# Patient Record
(demographics unavailable — no encounter records)

---

## 2024-10-28 NOTE — DISCUSSION/SUMMARY
[Medication Risks Reviewed] : Medication risks reviewed [de-identified] : She has been referred to pain management to see if epidural steroid injections or selective nerve root blocks may help her.  She will finish the diclofenac 75 mg twice a day and lowered to 50 mg.  Liver function profile in August was normal.  Long-term use of the nonsteroidals at the full dose is not reasonable nor is living with the symptoms if they get significantly worse.  She has had enough surgical treatment in the past and if surgery could be avoided that would be preferable.  I will see her for follow-up a week after her first epidural.

## 2024-10-28 NOTE — PHYSICAL EXAM
[de-identified] : She is comfortable sitting and straight leg raising is negative to 90 degrees bilaterally.

## 2024-12-01 NOTE — PHYSICAL EXAM
[Normal] : supple, no neck mass and thyroid not enlarged [Normal Neck Lymph Nodes] : normal neck lymph nodes  [Normal Supraclavicular Lymph Nodes] : normal supraclavicular lymph nodes [Normal Groin Lymph Nodes] : normal groin lymph nodes [Normal Axillary Lymph Nodes] : normal axillary lymph nodes [Normal] : oriented to person, place and time, with appropriate affect [de-identified] : bilateral NANCI flaps well healed. mild right breast erythema and swelling - post XRT changes vs mastitis.

## 2024-12-01 NOTE — HISTORY OF PRESENT ILLNESS
[de-identified] : Patient is a 60 y/o F who presents a chief complaint of breast pain. She has a personal history of right breast cancer (stage 1A IDC & DCIS) in 2008 tx with XRT, left breast cancer (high grade DCIS, ER-/IN-) in 2021, and right lung cancer (stage 1A right upper love adenocarcinoma) at age 55.  S/p bilateral mastectomies w/ NANCI flap and L SLNbx in 2021. Not on any endocrine therapy.  Referred by Dr. Ld Sotomayor of med-onc.  PMHx: S/p right upper lobe wedge resection and completion lobectomy and mediastinal lymph node dissection for non-small cell lung ca.  Pt is an employee for [employer].

## 2024-12-01 NOTE — ASSESSMENT
[FreeTextEntry1] : History of breast cancer S/p bilateral mastectomies in 2021 Right breast erythema likely secondary to post XRT changes vs mastitis  Tx with course of abx  Will order breast MRI  RTO 1 week

## 2024-12-01 NOTE — CONSULT LETTER
[Dear  ___] : Dear  [unfilled], [Consult Letter:] : I had the pleasure of evaluating your patient, [unfilled]. [Please see my note below.] : Please see my note below. [Sincerely,] : Sincerely, [FreeTextEntry3] : Eric Roman MD FACS

## 2024-12-01 NOTE — ADDENDUM
[FreeTextEntry1] : I, Kasey Ashton, acted solely as a scribe for Dr. Eric Roman on this date 11/27/2024.

## 2024-12-01 NOTE — HISTORY OF PRESENT ILLNESS
[de-identified] : Patient is a 58 y/o F who presents a chief complaint of breast pain. She has a personal history of right breast cancer (stage 1A IDC & DCIS) in 2008 tx with XRT, left breast cancer (high grade DCIS, ER-/[place]-) in 2021, and right lung cancer (stage 1A right upper love adenocarcinoma) at age 55.  S/p bilateral mastectomies w/ NANCI flap and L SLNbx in 2021. Not on any endocrine therapy.  Referred by Dr. Ld Sotomayor of med-onc.  PMHx: S/p right upper lobe wedge resection and completion lobectomy and mediastinal lymph node dissection for non-small cell lung ca.  Pt is an employee for [email or web address].

## 2024-12-01 NOTE — PHYSICAL EXAM
[Normal] : supple, no neck mass and thyroid not enlarged [Normal Neck Lymph Nodes] : normal neck lymph nodes  [Normal Supraclavicular Lymph Nodes] : normal supraclavicular lymph nodes [Normal Groin Lymph Nodes] : normal groin lymph nodes [Normal Axillary Lymph Nodes] : normal axillary lymph nodes [Normal] : oriented to person, place and time, with appropriate affect [de-identified] : bilateral NANCI flaps well healed. mild right breast erythema and swelling - post XRT changes vs mastitis.